# Patient Record
Sex: MALE | Race: WHITE | Employment: FULL TIME | URBAN - METROPOLITAN AREA
[De-identification: names, ages, dates, MRNs, and addresses within clinical notes are randomized per-mention and may not be internally consistent; named-entity substitution may affect disease eponyms.]

---

## 2018-10-08 PROCEDURE — 88112 CYTOPATH CELL ENHANCE TECH: CPT | Performed by: PATHOLOGY

## 2018-10-09 ENCOUNTER — LAB REQUISITION (OUTPATIENT)
Dept: LAB | Facility: HOSPITAL | Age: 38
End: 2018-10-09
Payer: COMMERCIAL

## 2018-10-09 DIAGNOSIS — R31.29 OTHER MICROSCOPIC HEMATURIA: ICD-10-CM

## 2019-05-15 ENCOUNTER — RX ONLY (RX ONLY)
Age: 39
End: 2019-05-15

## 2019-05-15 ENCOUNTER — DOCTOR'S OFFICE (OUTPATIENT)
Dept: URBAN - METROPOLITAN AREA CLINIC 137 | Facility: CLINIC | Age: 39
Setting detail: OPHTHALMOLOGY
End: 2019-05-15
Payer: COMMERCIAL

## 2019-05-15 DIAGNOSIS — H52.03: ICD-10-CM

## 2019-05-15 DIAGNOSIS — H52.223: ICD-10-CM

## 2019-05-15 PROCEDURE — 92015 DETERMINE REFRACTIVE STATE: CPT | Performed by: OPTOMETRIST

## 2019-05-15 PROCEDURE — 92310 CONTACT LENS FITTING OU: CPT | Performed by: OPTOMETRIST

## 2019-05-15 PROCEDURE — 92004 COMPRE OPH EXAM NEW PT 1/>: CPT | Performed by: OPTOMETRIST

## 2019-05-15 ASSESSMENT — REFRACTION_CURRENTRX
OD_VPRISM_DIRECTION: SV
OD_OVR_VA: 20/
OS_VPRISM_DIRECTION: SV
OD_CYLINDER: -1.25
OS_AXIS: 83
OS_SPHERE: +0.50
OS_OVR_VA: 20/
OD_SPHERE: +1.00
OS_CYLINDER: -1.00
OD_AXIS: 116
OD_OVR_VA: 20/
OS_OVR_VA: 20/
OD_OVR_VA: 20/
OS_OVR_VA: 20/

## 2019-05-15 ASSESSMENT — REFRACTION_MANIFEST
OS_VA2: 20/
OS_VA3: 20/
OU_VA: 20/
OD_VA2: 20/
OU_VA: 20/
OS_VA2: 20/
OS_SPHERE: PLANO
OS_VA3: 20/
OD_VA1: 20/
OS_VA1: 20/
OD_VA1: 20/20
OS_AXIS: 085
OD_AXIS: 110
OD_VA2: 20/
OD_SPHERE: +0.50
OS_CYLINDER: -1.00
OD_CYLINDER: -1.25
OD_VA3: 20/
OD_VA3: 20/
OS_VA1: 20/20

## 2019-05-15 ASSESSMENT — KERATOMETRY
OS_K1POWER_DIOPTERS: 44.75
OD_AXISANGLE_DEGREES: 155
OD_K2POWER_DIOPTERS: 45.00
OS_AXISANGLE_DEGREES: 19
OS_K2POWER_DIOPTERS: 45.25
OD_K1POWER_DIOPTERS: 44.25

## 2019-05-15 ASSESSMENT — REFRACTION_AUTOREFRACTION
OD_AXIS: 103
OS_AXIS: 87
OD_CYLINDER: -1.25
OD_SPHERE: 0.00
OS_CYLINDER: -0.75
OS_SPHERE: -0.50

## 2019-05-15 ASSESSMENT — CONFRONTATIONAL VISUAL FIELD TEST (CVF)
OS_FINDINGS: FULL
OD_FINDINGS: FULL

## 2019-05-15 ASSESSMENT — SPHEQUIV_DERIVED
OD_SPHEQUIV: -0.125
OS_SPHEQUIV: -0.875
OD_SPHEQUIV: -0.625

## 2019-05-15 ASSESSMENT — AXIALLENGTH_DERIVED
OS_AL: 23.3837
OD_AL: 23.2329
OD_AL: 23.4234

## 2019-05-15 ASSESSMENT — VISUAL ACUITY
OD_BCVA: 20/30-1
OS_BCVA: 20/30-1

## 2019-06-11 ENCOUNTER — OPTICAL OFFICE (OUTPATIENT)
Dept: URBAN - METROPOLITAN AREA CLINIC 146 | Facility: CLINIC | Age: 39
Setting detail: OPHTHALMOLOGY
End: 2019-06-11
Payer: COMMERCIAL

## 2019-06-11 DIAGNOSIS — H52.223: ICD-10-CM

## 2019-06-11 PROCEDURE — S0500 DISPOS CONT LENS: HCPCS | Performed by: OPTOMETRIST

## 2020-03-11 ENCOUNTER — OPTICAL OFFICE (OUTPATIENT)
Dept: URBAN - METROPOLITAN AREA CLINIC 146 | Facility: CLINIC | Age: 40
Setting detail: OPHTHALMOLOGY
End: 2020-03-11
Payer: COMMERCIAL

## 2020-03-11 DIAGNOSIS — H52.223: ICD-10-CM

## 2020-03-11 PROCEDURE — S0500 DISPOS CONT LENS: HCPCS | Performed by: OPTOMETRIST

## 2020-12-03 ENCOUNTER — DOCTOR'S OFFICE (OUTPATIENT)
Dept: URBAN - METROPOLITAN AREA CLINIC 137 | Facility: CLINIC | Age: 40
Setting detail: OPHTHALMOLOGY
End: 2020-12-03
Payer: COMMERCIAL

## 2020-12-03 DIAGNOSIS — H52.223: ICD-10-CM

## 2020-12-03 PROCEDURE — 92014 COMPRE OPH EXAM EST PT 1/>: CPT | Performed by: OPTOMETRIST

## 2020-12-03 PROCEDURE — 92310 CONTACT LENS FITTING OU: CPT | Performed by: OPTOMETRIST

## 2020-12-03 ASSESSMENT — SPHEQUIV_DERIVED
OD_SPHEQUIV: -0.125
OD_SPHEQUIV: -0.125
OD_SPHEQUIV: -0.25
OS_SPHEQUIV: -0.875

## 2020-12-03 ASSESSMENT — REFRACTION_CURRENTRX
OS_AXIS: 83
OD_CYLINDER: -1.25
OS_OVR_VA: 20/
OS_CYLINDER: -1.00
OS_SPHERE: +0.50
OD_AXIS: 116
OD_VPRISM_DIRECTION: SV
OS_VPRISM_DIRECTION: SV
OD_OVR_VA: 20/
OD_SPHERE: +1.00

## 2020-12-03 ASSESSMENT — AXIALLENGTH_DERIVED
OD_AL: 23.4152
OD_AL: 23.3673
OS_AL: 23.3387
OD_AL: 23.3673

## 2020-12-03 ASSESSMENT — REFRACTION_MANIFEST
OD_VA1: 20/20
OD_SPHERE: +0.50
OD_SPHERE: +0.50
OS_SPHERE: PL
OS_AXIS: 085
OS_VA1: 20/20
OS_CYLINDER: -1.00
OS_AXIS: 95
OD_CYLINDER: -1.25
OD_AXIS: 110
OS_CYLINDER: -1.25
OS_SPHERE: PLANO
OD_AXIS: 100
OD_VA1: 20/20
OS_VA1: 20/20
OD_CYLINDER: -1.50

## 2020-12-03 ASSESSMENT — REFRACTION_AUTOREFRACTION
OS_CYLINDER: -1.25
OD_CYLINDER: -1.25
OS_AXIS: 082
OS_SPHERE: -0.25
OD_SPHERE: +0.50
OD_AXIS: 113

## 2020-12-03 ASSESSMENT — TONOMETRY
OD_IOP_MMHG: 14
OS_IOP_MMHG: 16

## 2020-12-03 ASSESSMENT — CONFRONTATIONAL VISUAL FIELD TEST (CVF)
OD_FINDINGS: FULL
OS_FINDINGS: FULL

## 2020-12-03 ASSESSMENT — KERATOMETRY
OD_AXISANGLE_DEGREES: 154
OS_K1POWER_DIOPTERS: 44.75
OD_K2POWER_DIOPTERS: 44.75
OS_AXISANGLE_DEGREES: 16
OD_K1POWER_DIOPTERS: 43.75
OS_K2POWER_DIOPTERS: 45.50

## 2020-12-03 ASSESSMENT — VISUAL ACUITY
OS_BCVA: 20/20-1
OD_BCVA: 20/20

## 2020-12-15 ENCOUNTER — HOSPITAL ENCOUNTER (EMERGENCY)
Facility: HOSPITAL | Age: 40
Discharge: HOME | End: 2020-12-15
Attending: EMERGENCY MEDICINE | Admitting: EMERGENCY MEDICINE
Payer: COMMERCIAL

## 2020-12-15 VITALS
HEART RATE: 60 BPM | HEIGHT: 75 IN | TEMPERATURE: 97.7 F | BODY MASS INDEX: 31.46 KG/M2 | SYSTOLIC BLOOD PRESSURE: 140 MMHG | DIASTOLIC BLOOD PRESSURE: 84 MMHG | OXYGEN SATURATION: 97 % | WEIGHT: 253 LBS | RESPIRATION RATE: 18 BRPM

## 2020-12-15 DIAGNOSIS — F41.9 ANXIETY: ICD-10-CM

## 2020-12-15 DIAGNOSIS — R00.2 INTERMITTENT PALPITATIONS: Primary | ICD-10-CM

## 2020-12-15 LAB
ANION GAP SERPL CALCULATED.3IONS-SCNC: 11 MMOL/L (ref 4–13)
BASOPHILS # BLD AUTO: 0.04 THOUSANDS/ΜL (ref 0–0.1)
BASOPHILS NFR BLD AUTO: 1 % (ref 0–1)
BUN SERPL-MCNC: 11 MG/DL (ref 5–25)
CALCIUM SERPL-MCNC: 9.3 MG/DL (ref 8.3–10.1)
CHLORIDE SERPL-SCNC: 104 MMOL/L (ref 100–108)
CO2 SERPL-SCNC: 27 MMOL/L (ref 21–32)
CREAT SERPL-MCNC: 0.91 MG/DL (ref 0.6–1.3)
EOSINOPHIL # BLD AUTO: 0.07 THOUSAND/ΜL (ref 0–0.61)
EOSINOPHIL NFR BLD AUTO: 1 % (ref 0–6)
ERYTHROCYTE [DISTWIDTH] IN BLOOD BY AUTOMATED COUNT: 12.6 % (ref 11.6–15.1)
GFR SERPL CREATININE-BSD FRML MDRD: 104 ML/MIN/1.73SQ M
GLUCOSE SERPL-MCNC: 99 MG/DL (ref 65–140)
HCT VFR BLD AUTO: 47.2 % (ref 36.5–49.3)
HGB BLD-MCNC: 15.7 G/DL (ref 12–17)
IMM GRANULOCYTES # BLD AUTO: 0.02 THOUSAND/UL (ref 0–0.2)
IMM GRANULOCYTES NFR BLD AUTO: 0 % (ref 0–2)
LYMPHOCYTES # BLD AUTO: 1.45 THOUSANDS/ΜL (ref 0.6–4.47)
LYMPHOCYTES NFR BLD AUTO: 24 % (ref 14–44)
MCH RBC QN AUTO: 29.7 PG (ref 26.8–34.3)
MCHC RBC AUTO-ENTMCNC: 33.3 G/DL (ref 31.4–37.4)
MCV RBC AUTO: 89 FL (ref 82–98)
MONOCYTES # BLD AUTO: 0.52 THOUSAND/ΜL (ref 0.17–1.22)
MONOCYTES NFR BLD AUTO: 9 % (ref 4–12)
NEUTROPHILS # BLD AUTO: 4.05 THOUSANDS/ΜL (ref 1.85–7.62)
NEUTS SEG NFR BLD AUTO: 65 % (ref 43–75)
NRBC BLD AUTO-RTO: 0 /100 WBCS
PLATELET # BLD AUTO: 174 THOUSANDS/UL (ref 149–390)
PMV BLD AUTO: 10.4 FL (ref 8.9–12.7)
POTASSIUM SERPL-SCNC: 3.5 MMOL/L (ref 3.5–5.3)
RBC # BLD AUTO: 5.28 MILLION/UL (ref 3.88–5.62)
SODIUM SERPL-SCNC: 142 MMOL/L (ref 136–145)
TROPONIN I SERPL-MCNC: <0.02 NG/ML
TSH SERPL DL<=0.05 MIU/L-ACNC: 1.14 UIU/ML (ref 0.36–3.74)
WBC # BLD AUTO: 6.15 THOUSAND/UL (ref 4.31–10.16)

## 2020-12-15 PROCEDURE — 99285 EMERGENCY DEPT VISIT HI MDM: CPT

## 2020-12-15 PROCEDURE — 84443 ASSAY THYROID STIM HORMONE: CPT | Performed by: EMERGENCY MEDICINE

## 2020-12-15 PROCEDURE — 99285 EMERGENCY DEPT VISIT HI MDM: CPT | Performed by: EMERGENCY MEDICINE

## 2020-12-15 PROCEDURE — 93005 ELECTROCARDIOGRAM TRACING: CPT

## 2020-12-15 PROCEDURE — 85025 COMPLETE CBC W/AUTO DIFF WBC: CPT | Performed by: EMERGENCY MEDICINE

## 2020-12-15 PROCEDURE — 84484 ASSAY OF TROPONIN QUANT: CPT | Performed by: EMERGENCY MEDICINE

## 2020-12-15 PROCEDURE — 36415 COLL VENOUS BLD VENIPUNCTURE: CPT | Performed by: EMERGENCY MEDICINE

## 2020-12-15 PROCEDURE — 80048 BASIC METABOLIC PNL TOTAL CA: CPT | Performed by: EMERGENCY MEDICINE

## 2020-12-16 LAB
ATRIAL RATE: 62 BPM
P AXIS: 66 DEGREES
PR INTERVAL: 154 MS
QRS AXIS: 74 DEGREES
QRSD INTERVAL: 84 MS
QT INTERVAL: 406 MS
QTC INTERVAL: 406 MS
T WAVE AXIS: 63 DEGREES
VENTRICULAR RATE: 60 BPM

## 2020-12-16 PROCEDURE — 93010 ELECTROCARDIOGRAM REPORT: CPT | Performed by: INTERNAL MEDICINE

## 2024-09-09 ENCOUNTER — OFFICE VISIT (OUTPATIENT)
Dept: SURGERY | Facility: CLINIC | Age: 44
End: 2024-09-09
Payer: COMMERCIAL

## 2024-09-09 VITALS
HEIGHT: 75 IN | HEART RATE: 75 BPM | WEIGHT: 271.6 LBS | TEMPERATURE: 96.9 F | SYSTOLIC BLOOD PRESSURE: 140 MMHG | BODY MASS INDEX: 33.77 KG/M2 | DIASTOLIC BLOOD PRESSURE: 96 MMHG | OXYGEN SATURATION: 97 %

## 2024-09-09 DIAGNOSIS — K57.30 SIGMOID DIVERTICULOSIS: Primary | ICD-10-CM

## 2024-09-09 DIAGNOSIS — K40.20 BILATERAL INGUINAL HERNIA: ICD-10-CM

## 2024-09-09 PROCEDURE — 99204 OFFICE O/P NEW MOD 45 MIN: CPT | Performed by: SURGERY

## 2024-09-09 RX ORDER — TERBINAFINE HYDROCHLORIDE 250 MG/1
250 TABLET ORAL DAILY
COMMUNITY
Start: 2024-06-27

## 2024-09-09 RX ORDER — LISINOPRIL 20 MG/1
20 TABLET ORAL DAILY
COMMUNITY
Start: 2024-08-16

## 2024-09-09 RX ORDER — LORAZEPAM 0.5 MG/1
TABLET ORAL EVERY 8 HOURS PRN
COMMUNITY

## 2024-09-09 NOTE — PROGRESS NOTES
Ambulatory Visit  Name: Brandon Fraire      : 1980      MRN: [id number]  Encounter Provider: Laron Lomeli MD  Encounter Date: 2024   Encounter department: [facility] GENERAL SURGERY [place]    Assessment & Plan   1. Sigmoid diverticulosis  2. Bilateral inguinal hernia    Patient does not want any surgery for inguinal hernia.  Dietary advice was given.  Weight loss advice was given.  Since he recently had colonoscopy which was a complete colonoscopy we did not need to repeat it now.  He will call me if his symptoms change.  Follow-up with me in 6 months or as needed.  History of Present Illness     Brandon Fraire is a 44 y.o. male who presents to my office for consultation.  He says he would like me to review his CT scan of abdomen and pelvis which was done at [facility] about 10 days ago.  He says he had his first episode of sigmoid diverticulitis in March which resolved on oral antibiotics.  He says after that he had a colonoscopy and some polyps were removed which were benign.  He showed me the colonoscopy report on his cell phone.  He tells me that 10 days ago he had similar pain for which he was on oral antibiotics.  The pain did not resolve and he went to the ER and a CT scan was done which showed sigmoid diverticulosis, bilateral inguinal hernia, and mural thickening in the sigmoid colon likely sequelae of diverticulitis.  Patient does not complain of any nausea vomiting, abdominal pain, blood in stools, weight loss, change in bowel habits.    Review of Systems   Constitutional: Negative.    HENT: Negative.     Eyes: Negative.    Respiratory: Negative.     Cardiovascular: Negative.    Gastrointestinal: Negative.    Endocrine: Negative.    Genitourinary: Negative.    Musculoskeletal: Negative.    Allergic/Immunologic: Negative.    Neurological: Negative.    Hematological: Negative.    Psychiatric/Behavioral: Negative.       Medical History Reviewed by provider this encounter:   "Tobacco  Allergies  Problems  Med Hx  Surg Hx  Fam Hx       Past Medical History   Past Medical History:   Diagnosis Date    Colon polyp 3/15/24    Diverticulitis of colon 3/24/24    Hypertension      Past Surgical History:   Procedure Laterality Date    CHOLECYSTECTOMY      COLONOSCOPY  3/15/24    HERNIA REPAIR       Family History   Problem Relation Age of Onset    Ovarian cancer Mother      Current Outpatient Medications on File Prior to Visit   Medication Sig Dispense Refill    lisinopril (ZESTRIL) 20 mg tablet Take 20 mg by mouth daily      LORazepam (ATIVAN) 0.5 mg tablet Take by mouth every 8 (eight) hours as needed for anxiety      terbinafine (LamISIL) 250 mg tablet Take 250 mg by mouth daily       No current facility-administered medications on file prior to visit.   No Known Allergies   Current Outpatient Medications on File Prior to Visit   Medication Sig Dispense Refill    lisinopril (ZESTRIL) 20 mg tablet Take 20 mg by mouth daily      LORazepam (ATIVAN) 0.5 mg tablet Take by mouth every 8 (eight) hours as needed for anxiety      terbinafine (LamISIL) 250 mg tablet Take 250 mg by mouth daily       No current facility-administered medications on file prior to visit.      Social History     Tobacco Use    Smoking status: Never    Smokeless tobacco: Never   Substance and Sexual Activity    Alcohol use: Yes    Drug use: Not Currently    Sexual activity: Yes     Partners: Female     Birth control/protection: Male Sterilization     Objective     /96 (BP Location: Left arm, Patient Position: Sitting, Cuff Size: Standard)   Pulse 75   Temp (!) 96.9 °F (36.1 °C) (Tympanic)   Ht 6' 3\" (1.905 m)   Wt 123 kg (271 lb 9.6 oz)   SpO2 97%   BMI 33.95 kg/m²     Physical Exam  Vitals reviewed.   Constitutional:       Appearance: He is obese.   HENT:      Head: Normocephalic.      Nose: Nose normal.   Eyes:      Pupils: Pupils are equal, round, and reactive to light.   Cardiovascular:      Rate and " Rhythm: Normal rate and regular rhythm.   Pulmonary:      Effort: Pulmonary effort is normal.      Breath sounds: Normal breath sounds.   Abdominal:      General: Bowel sounds are normal. There is no distension.      Palpations: Abdomen is soft. There is no mass.      Tenderness: There is no abdominal tenderness. There is no guarding or rebound.      Comments: Bilateral inguinal cough impulse present.  No discrete hernia clinically present.   Genitourinary:     Penis: Normal.       Testes: Normal.   Musculoskeletal:      Cervical back: Normal range of motion.   Skin:     General: Skin is warm.   Neurological:      General: No focal deficit present.      Mental Status: He is alert and oriented to person, place, and time.   Psychiatric:         Mood and Affect: Mood normal.       Administrative Statements   I have spent a total time of 30 minutes in caring for this patient on the day of the visit/encounter including Diagnostic results, Prognosis, Risks and benefits of tx options, Instructions for management, Patient and family education, Importance of tx compliance, Risk factor reductions, Impressions, Counseling / Coordination of care, Documenting in the medical record, Reviewing / ordering tests, medicine, procedures  , Obtaining or reviewing history  , and Communicating with other healthcare professionals .

## 2024-09-20 ENCOUNTER — TELEPHONE (OUTPATIENT)
Age: 44
End: 2024-09-20

## 2024-09-20 ENCOUNTER — NURSE TRIAGE (OUTPATIENT)
Age: 44
End: 2024-09-20

## 2024-09-20 NOTE — TELEPHONE ENCOUNTER
Pt called in stating is having a diverticulitis episode. Unable to reach the practice. Warm transfer to CTS pod.

## 2024-09-20 NOTE — TELEPHONE ENCOUNTER
"Pt warm transferred from [place] to this CTS-RN. Pt with hx diverticulitis, previously treated by PCP, and last seen by Dr. Lomeli on 9/9/24.    Pt reports having another diverticulitis episode since yesterday. Endorses yesterday, having \"dull pain\" in LLQ with nausea, and this am awaking with severe 7-8/10 pain that feels the same as previous diverticulitis episodes.     Pt unsure if he has a fever; Pt has been taking tylenol to try to help with the pain w/o relief. Denies diarrhea/vomiting. Pt does not want to go to ED and would like to come into office today.     Called [facility] office clinical backline and spoke with Anu. Per Anu, no providers in office today and advised that Pt go to ED.     Updated Pt. Pt verbalized understanding and adamant that he does not want to go to ED. Advised Pt to call PCP; Pt verbalized understanding. Appt scheduled for Monday.   Reason for Disposition   Constant abdominal pain lasting > 2 hours    Answer Assessment - Initial Assessment Questions  1. LOCATION: \"Where does it hurt?\"       LLQ  2. RADIATION: \"Does the pain shoot anywhere else?\" (e.g., chest, back)      denies  3. ONSET: \"When did the pain begin?\" (Minutes, hours or days ago)       yesterday  4. SUDDEN: \"Gradual or sudden onset?\"      gradual  5. PATTERN \"Does the pain come and go, or is it constant?\"     - If constant: \"Is it getting better, staying the same, or worsening?\"       (Note: Constant means the pain never goes away completely; most serious pain is constant and it progresses)      - If intermittent: \"How long does it last?\" \"Do you have pain now?\"      (Note: Intermittent means the pain goes away completely between bouts)      constant  6. SEVERITY: \"How bad is the pain?\"  (e.g., Scale 1-10; mild, moderate, or severe)     - MILD (1-3): doesn't interfere with normal activities, abdomen soft and not tender to touch      - MODERATE (4-7): interferes with normal activities or awakens from sleep, tender to touch " "     - SEVERE (8-10): excruciating pain, doubled over, unable to do any normal activities        Moderate-severe  7. RECURRENT SYMPTOM: \"Have you ever had this type of stomach pain before?\" If Yes, ask: \"When was the last time?\" and \"What happened that time?\"       Yes, diverticulitis  8. CAUSE: \"What do you think is causing the stomach pain?\"      diverticulitis  9. RELIEVING/AGGRAVATING FACTORS: \"What makes it better or worse?\" (e.g., movement, antacids, bowel movement)      nothing  10. OTHER SYMPTOMS: \"Has there been any vomiting, diarrhea, constipation, or urine problems?\"        Nausea yesterday    Protocols used: Abdominal Pain - Male-ADULT-[place]    "

## 2024-09-23 ENCOUNTER — OFFICE VISIT (OUTPATIENT)
Dept: SURGERY | Facility: CLINIC | Age: 44
End: 2024-09-23
Payer: COMMERCIAL

## 2024-09-23 VITALS
SYSTOLIC BLOOD PRESSURE: 156 MMHG | HEIGHT: 75 IN | BODY MASS INDEX: 33.45 KG/M2 | OXYGEN SATURATION: 98 % | HEART RATE: 76 BPM | WEIGHT: 269 LBS | TEMPERATURE: 98 F | DIASTOLIC BLOOD PRESSURE: 106 MMHG

## 2024-09-23 DIAGNOSIS — K57.92 ACUTE DIVERTICULITIS: Primary | ICD-10-CM

## 2024-09-23 PROCEDURE — 99215 OFFICE O/P EST HI 40 MIN: CPT | Performed by: SURGERY

## 2024-09-23 RX ORDER — OXYCODONE HYDROCHLORIDE 10 MG/1
10 TABLET ORAL EVERY 4 HOURS
COMMUNITY
Start: 2024-09-20

## 2024-09-23 RX ORDER — LEVOFLOXACIN 750 MG/1
750 TABLET, FILM COATED ORAL DAILY
COMMUNITY
Start: 2024-09-20

## 2024-09-23 RX ORDER — METRONIDAZOLE 500 MG/1
500 TABLET ORAL EVERY 8 HOURS
COMMUNITY
Start: 2024-09-20

## 2024-09-23 RX ORDER — OXYCODONE AND ACETAMINOPHEN 5; 325 MG/1; MG/1
1 TABLET ORAL EVERY 6 HOURS PRN
Qty: 12 TABLET | Refills: 0 | Status: SHIPPED | OUTPATIENT
Start: 2024-09-23

## 2024-09-23 NOTE — PROGRESS NOTES
Ambulatory Visit  Name: Brandon Fraire      : 1980      MRN: [id number]  Encounter Provider: Laron Lomeli MD  Encounter Date: 2024   Encounter department: [facility] GENERAL SURGERY [place]    Assessment & Plan  Acute diverticulitis  Patient has most likely acute sigmoid diverticulitis.  I told him to continue oral antibiotics.  I am going to prescribe him some pain medication.  He was asked to drink a lot of liquids.  I am ordering a CT scan of abdomen pelvis with oral and IV contrast.  I also told him that if he starts having high-grade fever, generalized abdominal pain then he should come to the ER.  He is going to see me again at the end of the week.  We also talked about him thinking about definitive surgical procedure because this is his third episode.  I did offer him hospitalization however he feels he would be better managing this episode at home.         History of Present Illness     Brandon Fraire is a 44 y.o. male who presents to my office with 3-day history of left lower quadrant pain.  He says that the pain started on Friday.  It was 9 out of 10.  He went to see his primary care physician.  He was started on oral antibiotics.  He says now the pain is 7 or 8 out of 10.  No nausea or vomiting.  He is tolerating regular diet.  He is having regular bowel movements.  No blood in stools.  No complaints of generalized abdominal pain.  No complaints of fever.  Patient tells me that he would like to manage this episode as outpatient.    History obtained from : patient  Review of Systems   Constitutional: Negative.    HENT: Negative.     Eyes: Negative.    Respiratory: Negative.     Cardiovascular: Negative.    Gastrointestinal:  Positive for abdominal pain. Negative for abdominal distention, anal bleeding, blood in stool, constipation, diarrhea, nausea, rectal pain and vomiting.   Endocrine: Negative.    Genitourinary: Negative.    Musculoskeletal: Negative.    Skin: Negative.     Allergic/Immunologic: Negative.    Neurological: Negative.    Hematological: Negative.    Psychiatric/Behavioral: Negative.       Medical History Reviewed by provider this encounter:  Tobacco  Allergies  Meds  Problems  Med Hx  Surg Hx  Fam Hx       Past Medical History   Past Medical History:   Diagnosis Date    Colon polyp 3/15/24    Diverticulitis of colon 3/24/24    Hypertension      Past Surgical History:   Procedure Laterality Date    CHOLECYSTECTOMY      COLONOSCOPY  3/15/24    HERNIA REPAIR       Family History   Problem Relation Age of Onset    Ovarian cancer Mother      Current Outpatient Medications on File Prior to Visit   Medication Sig Dispense Refill    levofloxacin (LEVAQUIN) 750 mg tablet Take 750 mg by mouth daily      lisinopril (ZESTRIL) 20 mg tablet Take 20 mg by mouth daily      metroNIDAZOLE (FLAGYL) 500 mg tablet Take 500 mg by mouth every 8 (eight) hours      oxyCODONE (ROXICODONE) 10 MG TABS Take 10 mg by mouth every 4 (four) hours      LORazepam (ATIVAN) 0.5 mg tablet Take by mouth every 8 (eight) hours as needed for anxiety (Patient not taking: Reported on 9/23/2024)      terbinafine (LamISIL) 250 mg tablet Take 250 mg by mouth daily (Patient not taking: Reported on 9/23/2024)       No current facility-administered medications on file prior to visit.   No Known Allergies   Current Outpatient Medications on File Prior to Visit   Medication Sig Dispense Refill    levofloxacin (LEVAQUIN) 750 mg tablet Take 750 mg by mouth daily      lisinopril (ZESTRIL) 20 mg tablet Take 20 mg by mouth daily      metroNIDAZOLE (FLAGYL) 500 mg tablet Take 500 mg by mouth every 8 (eight) hours      oxyCODONE (ROXICODONE) 10 MG TABS Take 10 mg by mouth every 4 (four) hours      LORazepam (ATIVAN) 0.5 mg tablet Take by mouth every 8 (eight) hours as needed for anxiety (Patient not taking: Reported on 9/23/2024)      terbinafine (LamISIL) 250 mg tablet Take 250 mg by mouth daily (Patient not taking:  "Reported on 9/23/2024)       No current facility-administered medications on file prior to visit.      Social History     Tobacco Use    Smoking status: Never    Smokeless tobacco: Never   Substance and Sexual Activity    Alcohol use: Yes    Drug use: Not Currently    Sexual activity: Yes     Partners: Female     Birth control/protection: Male Sterilization         Objective     BP (!) 156/106 (BP Location: Left arm, Patient Position: Sitting, Cuff Size: Adult)   Pulse 76   Temp 98 °F (36.7 °C) (Tympanic)   Ht 6' 3\" (1.905 m)   Wt 122 kg (269 lb)   SpO2 98%   BMI 33.62 kg/m²     Physical Exam  Vitals reviewed.   Constitutional:       Appearance: He is obese.   HENT:      Head: Normocephalic.      Nose: Nose normal.   Eyes:      Pupils: Pupils are equal, round, and reactive to light.   Cardiovascular:      Rate and Rhythm: Normal rate and regular rhythm.   Pulmonary:      Effort: Pulmonary effort is normal.      Breath sounds: Normal breath sounds.   Abdominal:      General: Abdomen is flat. There is no distension.      Palpations: Abdomen is soft. There is no mass.      Tenderness: There is abdominal tenderness (Left lower quadrant tenderness.  No rebound.). There is guarding. There is no rebound.      Hernia: No hernia is present.   Musculoskeletal:         General: Normal range of motion.      Cervical back: Normal range of motion.   Skin:     General: Skin is warm.   Neurological:      General: No focal deficit present.      Mental Status: He is alert.   Psychiatric:         Mood and Affect: Mood normal.       Administrative Statements   I have spent a total time of 45 minutes in caring for this patient on the day of the visit/encounter including Diagnostic results, Prognosis, Risks and benefits of tx options, Instructions for management, Patient and family education, Importance of tx compliance, Risk factor reductions, Impressions, Counseling / Coordination of care, Documenting in the medical record, " Reviewing / ordering tests, medicine, procedures  , Obtaining or reviewing history  , and Communicating with other healthcare professionals .

## 2024-09-26 ENCOUNTER — HOSPITAL ENCOUNTER (OUTPATIENT)
Dept: CT IMAGING | Facility: HOSPITAL | Age: 44
End: 2024-09-26
Attending: SURGERY
Payer: COMMERCIAL

## 2024-09-26 DIAGNOSIS — K57.92 ACUTE DIVERTICULITIS: ICD-10-CM

## 2024-09-26 PROCEDURE — 74177 CT ABD & PELVIS W/CONTRAST: CPT

## 2024-09-26 RX ADMIN — IOHEXOL 75 ML: 350 INJECTION, SOLUTION INTRAVENOUS at 10:23

## 2024-09-27 ENCOUNTER — OFFICE VISIT (OUTPATIENT)
Dept: SURGERY | Facility: CLINIC | Age: 44
End: 2024-09-27
Payer: COMMERCIAL

## 2024-09-27 VITALS
TEMPERATURE: 97.2 F | HEART RATE: 73 BPM | DIASTOLIC BLOOD PRESSURE: 90 MMHG | HEIGHT: 75 IN | SYSTOLIC BLOOD PRESSURE: 128 MMHG | WEIGHT: 269.2 LBS | OXYGEN SATURATION: 97 % | BODY MASS INDEX: 33.47 KG/M2

## 2024-09-27 DIAGNOSIS — K57.92 ACUTE DIVERTICULITIS: Primary | ICD-10-CM

## 2024-09-27 PROCEDURE — 99213 OFFICE O/P EST LOW 20 MIN: CPT | Performed by: SURGERY

## 2024-09-27 NOTE — PROGRESS NOTES
Ambulatory Visit  Name: Brandon Fraire      : 1980      MRN: [id number]  Encounter Provider: Laron Lomeli MD  Encounter Date: 2024   Encounter department: [facility] SURGERY [place]    Assessment & Plan  Acute diverticulitis       Patient is doing well.  I told him that as per my review of CT scan images he does not have any free air or excessive inflammations.  We will wait for the CT scan results.  He was given a dietary referral for diet management.  He was also counseled regarding importance of weight loss.  Follow-up with me as needed .    History of Present Illness     Brandon Fraire is a 44 y.o. male who presents for follow-up.  He says that the pain is completely gone.  He is having regular bowel movements.  No nausea or vomiting.  No fevers.  He completed oral antibiotics today.  He had a CT scan of the abdomen pelvis yesterday which has not been reported.  I however looked at the pictures and discussed the pictures with him.    History obtained from : patient  Review of Systems   All other systems reviewed and are negative.    Medical History Reviewed by provider this encounter:  Tobacco  Allergies  Meds  Problems  Med Hx  Surg Hx  Fam Hx       Past Medical History   Past Medical History:   Diagnosis Date    Colon polyp 3/15/24    Diverticulitis of colon 3/24/24    Hypertension      Past Surgical History:   Procedure Laterality Date    CHOLECYSTECTOMY      COLONOSCOPY  3/15/24    HERNIA REPAIR       Family History   Problem Relation Age of Onset    Ovarian cancer Mother      Current Outpatient Medications on File Prior to Visit   Medication Sig Dispense Refill    levofloxacin (LEVAQUIN) 750 mg tablet Take 750 mg by mouth daily      lisinopril (ZESTRIL) 20 mg tablet Take 20 mg by mouth daily      metroNIDAZOLE (FLAGYL) 500 mg tablet Take 500 mg by mouth every 8 (eight) hours      LORazepam (ATIVAN) 0.5 mg tablet Take by mouth every 8 (eight) hours as needed for anxiety (Patient  not taking: Reported on 9/23/2024)      naloxone (NARCAN) 4 mg/0.1 mL nasal spray Administer 1 spray into a nostril. If no response after 2-3 minutes, give another dose in the other nostril using a new spray. 1 each 1    oxyCODONE (ROXICODONE) 10 MG TABS Take 10 mg by mouth every 4 (four) hours      oxyCODONE-acetaminophen (Percocet) 5-325 mg per tablet Take 1 tablet by mouth every 6 (six) hours as needed for moderate pain Max Daily Amount: 4 tablets 12 tablet 0    terbinafine (LamISIL) 250 mg tablet Take 250 mg by mouth daily (Patient not taking: Reported on 9/23/2024)       Current Facility-Administered Medications on File Prior to Visit   Medication Dose Route Frequency Provider Last Rate Last Admin    [COMPLETED] barium (READI-CAT 2) suspension 900 mL  900 mL Oral Once in imaging Laron Lomeli MD   900 mL at 09/26/24 1020    [COMPLETED] iohexol (OMNIPAQUE) 350 MG/ML injection (MULTI-DOSE) 75 mL  75 mL Intravenous Once in imaging Laron Lomeli MD   75 mL at 09/26/24 1023   No Known Allergies   Current Outpatient Medications on File Prior to Visit   Medication Sig Dispense Refill    levofloxacin (LEVAQUIN) 750 mg tablet Take 750 mg by mouth daily      lisinopril (ZESTRIL) 20 mg tablet Take 20 mg by mouth daily      metroNIDAZOLE (FLAGYL) 500 mg tablet Take 500 mg by mouth every 8 (eight) hours      LORazepam (ATIVAN) 0.5 mg tablet Take by mouth every 8 (eight) hours as needed for anxiety (Patient not taking: Reported on 9/23/2024)      naloxone (NARCAN) 4 mg/0.1 mL nasal spray Administer 1 spray into a nostril. If no response after 2-3 minutes, give another dose in the other nostril using a new spray. 1 each 1    oxyCODONE (ROXICODONE) 10 MG TABS Take 10 mg by mouth every 4 (four) hours      oxyCODONE-acetaminophen (Percocet) 5-325 mg per tablet Take 1 tablet by mouth every 6 (six) hours as needed for moderate pain Max Daily Amount: 4 tablets 12 tablet 0    terbinafine (LamISIL) 250 mg tablet Take 250 mg by  "mouth daily (Patient not taking: Reported on 9/23/2024)       Current Facility-Administered Medications on File Prior to Visit   Medication Dose Route Frequency Provider Last Rate Last Admin    [COMPLETED] barium (READI-CAT 2) suspension 900 mL  900 mL Oral Once in imaging Laron Lomeli MD   900 mL at 09/26/24 1020    [COMPLETED] iohexol (OMNIPAQUE) 350 MG/ML injection (MULTI-DOSE) 75 mL  75 mL Intravenous Once in imaging Laron Lomeli MD   75 mL at 09/26/24 1023      Social History     Tobacco Use    Smoking status: Never    Smokeless tobacco: Never   Substance and Sexual Activity    Alcohol use: Yes    Drug use: Not Currently    Sexual activity: Yes     Partners: Female     Birth control/protection: Male Sterilization         Objective     /90 (BP Location: Left arm, Patient Position: Sitting, Cuff Size: Standard)   Pulse 73   Temp (!) 97.2 °F (36.2 °C) (Tympanic)   Ht 6' 3\" (1.905 m)   Wt 122 kg (269 lb 3.2 oz)   SpO2 97%   BMI 33.65 kg/m²     Physical Exam  Vitals reviewed.   Constitutional:       Appearance: He is obese.   Cardiovascular:      Rate and Rhythm: Normal rate and regular rhythm.   Pulmonary:      Effort: Pulmonary effort is normal.      Breath sounds: Normal breath sounds.   Abdominal:      General: Abdomen is flat. Bowel sounds are normal.      Palpations: Abdomen is soft. There is no mass.      Tenderness: There is no abdominal tenderness.      Hernia: No hernia is present.   Musculoskeletal:         General: Normal range of motion.   Skin:     General: Skin is warm.   Neurological:      General: No focal deficit present.      Mental Status: He is alert.       Administrative Statements   I have spent a total time of 30 minutes in caring for this patient on the day of the visit/encounter including Diagnostic results, Prognosis, Risks and benefits of tx options, Instructions for management, Patient and family education, Importance of tx compliance, Risk factor reductions, Impressions, " Counseling / Coordination of care, Documenting in the medical record, Reviewing / ordering tests, medicine, procedures  , Obtaining or reviewing history  , and Communicating with other healthcare professionals .

## 2024-11-22 ENCOUNTER — TELEPHONE (OUTPATIENT)
Age: 44
End: 2024-11-22

## 2024-11-22 NOTE — TELEPHONE ENCOUNTER
Patient called in due to a possible diverticulitis flair up and asked to speak with a nurse, [email or web address] damon to [facility] for further assistance.

## 2024-11-22 NOTE — TELEPHONE ENCOUNTER
Patient of . Has a history of Diverticulitis. Has been on a new diet . Ate some raspberries yesterday and thinks this started a problem. Started last evening with constant dull aching pain in left lower quadrant of abdomen. Has no other systems. As Dr. Lomeli is not currently in the office , patient is going to contact PCP. Cannot afford to go to the ER.

## 2025-01-27 ENCOUNTER — OFFICE VISIT (OUTPATIENT)
Dept: SURGERY | Facility: CLINIC | Age: 45
End: 2025-01-27
Payer: COMMERCIAL

## 2025-01-27 VITALS
DIASTOLIC BLOOD PRESSURE: 84 MMHG | BODY MASS INDEX: 33.45 KG/M2 | WEIGHT: 269 LBS | OXYGEN SATURATION: 98 % | TEMPERATURE: 97.6 F | HEART RATE: 82 BPM | SYSTOLIC BLOOD PRESSURE: 136 MMHG | HEIGHT: 75 IN

## 2025-01-27 DIAGNOSIS — Z01.812 PRE-PROCEDURAL LABORATORY EXAMINATIONS: ICD-10-CM

## 2025-01-27 DIAGNOSIS — K57.30 SIGMOID DIVERTICULOSIS: ICD-10-CM

## 2025-01-27 DIAGNOSIS — K57.92 ACUTE DIVERTICULITIS: Primary | ICD-10-CM

## 2025-01-27 PROCEDURE — 99214 OFFICE O/P EST MOD 30 MIN: CPT | Performed by: SURGERY

## 2025-01-27 RX ORDER — ONDANSETRON 4 MG/1
4 TABLET, FILM COATED ORAL EVERY 8 HOURS PRN
Qty: 20 TABLET | Refills: 0 | Status: SHIPPED | OUTPATIENT
Start: 2025-01-27

## 2025-01-27 RX ORDER — LEVOFLOXACIN 750 MG/1
750 TABLET, FILM COATED ORAL EVERY 24 HOURS
Qty: 14 TABLET | Refills: 0 | Status: SHIPPED | OUTPATIENT
Start: 2025-01-27 | End: 2025-02-10

## 2025-01-27 RX ORDER — OXYCODONE AND ACETAMINOPHEN 10; 325 MG/1; MG/1
1 TABLET ORAL EVERY 6 HOURS PRN
Qty: 20 TABLET | Refills: 0 | Status: SHIPPED | OUTPATIENT
Start: 2025-01-27

## 2025-01-27 RX ORDER — AMLODIPINE BESYLATE 5 MG/1
5 TABLET ORAL DAILY
COMMUNITY
Start: 2024-10-29

## 2025-01-27 RX ORDER — METRONIDAZOLE 500 MG/1
500 TABLET ORAL EVERY 8 HOURS SCHEDULED
Qty: 42 TABLET | Refills: 0 | Status: SHIPPED | OUTPATIENT
Start: 2025-01-27 | End: 2025-02-10

## 2025-01-27 NOTE — PROGRESS NOTES
Name: Brandon Fraire      : 1980      MRN: [id number]  Encounter Provider: Laron Lomeli MD  Encounter Date: 2025   Encounter department: [facility]  :  Assessment & Plan  Acute diverticulitis    Orders:    CT abdomen pelvis w contrast; Future    levofloxacin (LEVAQUIN) 750 mg tablet; Take 1 tablet (750 mg total) by mouth every 24 hours for 14 days    metroNIDAZOLE (FLAGYL) 500 mg tablet; Take 1 tablet (500 mg total) by mouth every 8 (eight) hours for 14 days    ondansetron (ZOFRAN) 4 mg tablet; Take 1 tablet (4 mg total) by mouth every 8 (eight) hours as needed for nausea or vomiting    oxyCODONE-acetaminophen (Percocet)  mg per tablet; Take 1 tablet by mouth every 6 (six) hours as needed for moderate pain Max Daily Amount: 4 tablets    Sigmoid diverticulosis    Orders:    CT abdomen pelvis w contrast; Future    levofloxacin (LEVAQUIN) 750 mg tablet; Take 1 tablet (750 mg total) by mouth every 24 hours for 14 days    metroNIDAZOLE (FLAGYL) 500 mg tablet; Take 1 tablet (500 mg total) by mouth every 8 (eight) hours for 14 days    ondansetron (ZOFRAN) 4 mg tablet; Take 1 tablet (4 mg total) by mouth every 8 (eight) hours as needed for nausea or vomiting    oxyCODONE-acetaminophen (Percocet)  mg per tablet; Take 1 tablet by mouth every 6 (six) hours as needed for moderate pain Max Daily Amount: 4 tablets    Pre-procedural laboratory examinations    Orders:    Comprehensive metabolic panel; Future    Patient had colonoscopy done at [facility] 1 year ago.  We will get the documents.  I am starting him on oral antibiotics.  Patient does not want to go to the ER.  I told him that if he starts having more pain or the pain becomes generalized he should go to the ER.  I am going to see him after he comes back from [place].  I am giving him 2 weeks of antibiotics, antinausea medication as well as pain medication.  I also told him that he would be benefiting from  definitive surgery of sigmoid colectomy.    History of Present Illness   44-year-old male patient came to my office with complaints of left lower quadrant pain.  He says that the pain started last night.  He had once fever spike of 101 but he took Tylenol and it improved.  He was not found to have fever in my office.  No nausea or vomiting.  He had liquid bowel movement and had been passing flatus.  Patient says that he is flying out to [place] this evening for work.  He does not want to go to the ER.  This is his third such episode.      Brandon Fraire is a 44 y.o. male who presents   History obtained from: patient    Review of Systems   Constitutional:  Positive for fever.   HENT: Negative.     Eyes: Negative.    Respiratory: Negative.     Cardiovascular: Negative.    Gastrointestinal:  Positive for abdominal pain. Negative for anal bleeding, blood in stool, constipation, diarrhea, nausea, rectal pain and vomiting.   Endocrine: Negative.    Genitourinary: Negative.    Musculoskeletal: Negative.    Skin: Negative.    Allergic/Immunologic: Negative.    Neurological: Negative.    Hematological: Negative.    Psychiatric/Behavioral: Negative.       Medical History Reviewed by provider this encounter:  Tobacco  Allergies  Meds  Problems  Med Hx  Surg Hx  Fam Hx     .  Past Medical History   Past Medical History:   Diagnosis Date    Colon polyp 3/15/24    Diverticulitis of colon 3/24/24    Hypertension      Past Surgical History:   Procedure Laterality Date    CHOLECYSTECTOMY      COLONOSCOPY  3/15/24    HERNIA REPAIR       Family History   Problem Relation Age of Onset    Ovarian cancer Mother       reports that he has never smoked. He has never used smokeless tobacco. He reports current alcohol use. He reports that he does not currently use drugs.  Current Outpatient Medications on File Prior to Visit   Medication Sig Dispense Refill    amLODIPine (NORVASC) 5 mg tablet Take 5 mg by mouth daily      lisinopril  (ZESTRIL) 20 mg tablet Take 20 mg by mouth daily (Patient taking differently: Take 40 mg by mouth daily)      metroNIDAZOLE (FLAGYL) 500 mg tablet Take 500 mg by mouth every 8 (eight) hours      naloxone (NARCAN) 4 mg/0.1 mL nasal spray Administer 1 spray into a nostril. If no response after 2-3 minutes, give another dose in the other nostril using a new spray. 1 each 1    oxyCODONE (ROXICODONE) 10 MG TABS Take 10 mg by mouth every 4 (four) hours      oxyCODONE-acetaminophen (Percocet) 5-325 mg per tablet Take 1 tablet by mouth every 6 (six) hours as needed for moderate pain Max Daily Amount: 4 tablets 12 tablet 0    levofloxacin (LEVAQUIN) 750 mg tablet Take 750 mg by mouth daily (Patient not taking: Reported on 1/27/2025)      LORazepam (ATIVAN) 0.5 mg tablet Take by mouth every 8 (eight) hours as needed for anxiety (Patient not taking: Reported on 1/27/2025)      terbinafine (LamISIL) 250 mg tablet Take 250 mg by mouth daily (Patient not taking: Reported on 1/27/2025)       No current facility-administered medications on file prior to visit.   No Known Allergies   Current Outpatient Medications on File Prior to Visit   Medication Sig Dispense Refill    amLODIPine (NORVASC) 5 mg tablet Take 5 mg by mouth daily      lisinopril (ZESTRIL) 20 mg tablet Take 20 mg by mouth daily (Patient taking differently: Take 40 mg by mouth daily)      metroNIDAZOLE (FLAGYL) 500 mg tablet Take 500 mg by mouth every 8 (eight) hours      naloxone (NARCAN) 4 mg/0.1 mL nasal spray Administer 1 spray into a nostril. If no response after 2-3 minutes, give another dose in the other nostril using a new spray. 1 each 1    oxyCODONE (ROXICODONE) 10 MG TABS Take 10 mg by mouth every 4 (four) hours      oxyCODONE-acetaminophen (Percocet) 5-325 mg per tablet Take 1 tablet by mouth every 6 (six) hours as needed for moderate pain Max Daily Amount: 4 tablets 12 tablet 0    levofloxacin (LEVAQUIN) 750 mg tablet Take 750 mg by mouth daily (Patient  "not taking: Reported on 1/27/2025)      LORazepam (ATIVAN) 0.5 mg tablet Take by mouth every 8 (eight) hours as needed for anxiety (Patient not taking: Reported on 1/27/2025)      terbinafine (LamISIL) 250 mg tablet Take 250 mg by mouth daily (Patient not taking: Reported on 1/27/2025)       No current facility-administered medications on file prior to visit.      Social History     Tobacco Use    Smoking status: Never    Smokeless tobacco: Never   Substance and Sexual Activity    Alcohol use: Yes    Drug use: Not Currently    Sexual activity: Yes     Partners: Female     Birth control/protection: Male Sterilization        Objective   /84 (BP Location: Right arm, Patient Position: Sitting, Cuff Size: Standard)   Pulse 82   Temp 97.6 °F (36.4 °C) (Tympanic)   Ht 6' 3\" (1.905 m)   Wt 122 kg (269 lb)   SpO2 98%   BMI 33.62 kg/m²      Physical Exam  Vitals reviewed.   Constitutional:       Appearance: Normal appearance.   HENT:      Head: Normocephalic.      Nose: Nose normal.   Eyes:      Pupils: Pupils are equal, round, and reactive to light.   Cardiovascular:      Rate and Rhythm: Normal rate and regular rhythm.   Pulmonary:      Effort: Pulmonary effort is normal.      Breath sounds: Normal breath sounds.   Abdominal:      General: Abdomen is flat.      Palpations: Abdomen is soft.      Tenderness: There is abdominal tenderness (Tender in the left lower quadrant.). There is no guarding or rebound.      Hernia: No hernia is present.   Musculoskeletal:      Cervical back: Normal range of motion.   Neurological:      Mental Status: He is alert.         Administrative Statements   I have spent a total time of 30 minutes in caring for this patient on the day of the visit/encounter including Prognosis, Risks and benefits of tx options, Instructions for management, Patient and family education, Importance of tx compliance, Risk factor reductions, Impressions, Counseling / Coordination of care, Documenting in " the medical record, Reviewing / ordering tests, medicine, procedures  , Obtaining or reviewing history  , and Communicating with other healthcare professionals .

## 2025-02-16 ENCOUNTER — HOSPITAL ENCOUNTER (EMERGENCY)
Facility: HOSPITAL | Age: 45
Discharge: HOME | End: 2025-02-16
Attending: EMERGENCY MEDICINE | Admitting: EMERGENCY MEDICINE
Payer: COMMERCIAL

## 2025-02-16 ENCOUNTER — APPOINTMENT (EMERGENCY)
Dept: CT IMAGING | Facility: HOSPITAL | Age: 45
End: 2025-02-16
Payer: COMMERCIAL

## 2025-02-16 VITALS
DIASTOLIC BLOOD PRESSURE: 73 MMHG | TEMPERATURE: 97.8 F | HEART RATE: 76 BPM | OXYGEN SATURATION: 99 % | RESPIRATION RATE: 18 BRPM | SYSTOLIC BLOOD PRESSURE: 130 MMHG

## 2025-02-16 DIAGNOSIS — R10.9 ABDOMINAL PAIN: Primary | ICD-10-CM

## 2025-02-16 LAB
ALBUMIN SERPL BCG-MCNC: 4.7 G/DL (ref 3.5–5)
ALP SERPL-CCNC: 62 U/L (ref 34–104)
ALT SERPL W P-5'-P-CCNC: 27 U/L (ref 7–52)
ANION GAP SERPL CALCULATED.3IONS-SCNC: 10 MMOL/L (ref 4–13)
AST SERPL W P-5'-P-CCNC: 20 U/L (ref 13–39)
BASOPHILS # BLD AUTO: 0.05 THOUSANDS/ΜL (ref 0–0.1)
BASOPHILS NFR BLD AUTO: 1 % (ref 0–1)
BILIRUB DIRECT SERPL-MCNC: 0.13 MG/DL (ref 0–0.2)
BILIRUB SERPL-MCNC: 0.67 MG/DL (ref 0.2–1)
BUN SERPL-MCNC: 15 MG/DL (ref 5–25)
CALCIUM SERPL-MCNC: 9.5 MG/DL (ref 8.4–10.2)
CHLORIDE SERPL-SCNC: 103 MMOL/L (ref 96–108)
CO2 SERPL-SCNC: 27 MMOL/L (ref 21–32)
CREAT SERPL-MCNC: 0.94 MG/DL (ref 0.6–1.3)
EOSINOPHIL # BLD AUTO: 0.11 THOUSAND/ΜL (ref 0–0.61)
EOSINOPHIL NFR BLD AUTO: 2 % (ref 0–6)
ERYTHROCYTE [DISTWIDTH] IN BLOOD BY AUTOMATED COUNT: 12.5 % (ref 11.6–15.1)
GFR SERPL CREATININE-BSD FRML MDRD: 97 ML/MIN/1.73SQ M
GLUCOSE SERPL-MCNC: 91 MG/DL (ref 65–140)
HCT VFR BLD AUTO: 45.6 % (ref 36.5–49.3)
HGB BLD-MCNC: 14.9 G/DL (ref 12–17)
IMM GRANULOCYTES # BLD AUTO: 0.03 THOUSAND/UL (ref 0–0.2)
IMM GRANULOCYTES NFR BLD AUTO: 0 % (ref 0–2)
LIPASE SERPL-CCNC: 7 U/L (ref 11–82)
LYMPHOCYTES # BLD AUTO: 1.98 THOUSANDS/ΜL (ref 0.6–4.47)
LYMPHOCYTES NFR BLD AUTO: 28 % (ref 14–44)
MCH RBC QN AUTO: 29.9 PG (ref 26.8–34.3)
MCHC RBC AUTO-ENTMCNC: 32.7 G/DL (ref 31.4–37.4)
MCV RBC AUTO: 92 FL (ref 82–98)
MONOCYTES # BLD AUTO: 0.6 THOUSAND/ΜL (ref 0.17–1.22)
MONOCYTES NFR BLD AUTO: 9 % (ref 4–12)
NEUTROPHILS # BLD AUTO: 4.33 THOUSANDS/ΜL (ref 1.85–7.62)
NEUTS SEG NFR BLD AUTO: 60 % (ref 43–75)
NRBC BLD AUTO-RTO: 0 /100 WBCS
PLATELET # BLD AUTO: 167 THOUSANDS/UL (ref 149–390)
PMV BLD AUTO: 9.5 FL (ref 8.9–12.7)
POTASSIUM SERPL-SCNC: 4.1 MMOL/L (ref 3.5–5.3)
PROT SERPL-MCNC: 7 G/DL (ref 6.4–8.4)
RBC # BLD AUTO: 4.98 MILLION/UL (ref 3.88–5.62)
SODIUM SERPL-SCNC: 140 MMOL/L (ref 135–147)
WBC # BLD AUTO: 7.1 THOUSAND/UL (ref 4.31–10.16)

## 2025-02-16 PROCEDURE — 99284 EMERGENCY DEPT VISIT MOD MDM: CPT | Performed by: EMERGENCY MEDICINE

## 2025-02-16 PROCEDURE — 83690 ASSAY OF LIPASE: CPT | Performed by: EMERGENCY MEDICINE

## 2025-02-16 PROCEDURE — 99284 EMERGENCY DEPT VISIT MOD MDM: CPT

## 2025-02-16 PROCEDURE — 96365 THER/PROPH/DIAG IV INF INIT: CPT

## 2025-02-16 PROCEDURE — 80048 BASIC METABOLIC PNL TOTAL CA: CPT | Performed by: EMERGENCY MEDICINE

## 2025-02-16 PROCEDURE — 85025 COMPLETE CBC W/AUTO DIFF WBC: CPT | Performed by: EMERGENCY MEDICINE

## 2025-02-16 PROCEDURE — 96375 TX/PRO/DX INJ NEW DRUG ADDON: CPT

## 2025-02-16 PROCEDURE — 80076 HEPATIC FUNCTION PANEL: CPT | Performed by: EMERGENCY MEDICINE

## 2025-02-16 PROCEDURE — 74177 CT ABD & PELVIS W/CONTRAST: CPT

## 2025-02-16 PROCEDURE — 36415 COLL VENOUS BLD VENIPUNCTURE: CPT | Performed by: EMERGENCY MEDICINE

## 2025-02-16 RX ORDER — ACETAMINOPHEN 10 MG/ML
1000 INJECTION, SOLUTION INTRAVENOUS ONCE
Status: COMPLETED | OUTPATIENT
Start: 2025-02-16 | End: 2025-02-16

## 2025-02-16 RX ORDER — OXYCODONE HYDROCHLORIDE 5 MG/1
5 TABLET ORAL ONCE
Refills: 0 | Status: COMPLETED | OUTPATIENT
Start: 2025-02-16 | End: 2025-02-16

## 2025-02-16 RX ORDER — KETOROLAC TROMETHAMINE 30 MG/ML
15 INJECTION, SOLUTION INTRAMUSCULAR; INTRAVENOUS ONCE
Status: COMPLETED | OUTPATIENT
Start: 2025-02-16 | End: 2025-02-16

## 2025-02-16 RX ORDER — DICYCLOMINE HCL 20 MG
20 TABLET ORAL ONCE
Status: COMPLETED | OUTPATIENT
Start: 2025-02-16 | End: 2025-02-16

## 2025-02-16 RX ORDER — DICYCLOMINE HCL 20 MG
20 TABLET ORAL 2 TIMES DAILY PRN
Qty: 20 TABLET | Refills: 0 | Status: SHIPPED | OUTPATIENT
Start: 2025-02-16

## 2025-02-16 RX ADMIN — DICYCLOMINE HYDROCHLORIDE 20 MG: 20 TABLET ORAL at 16:43

## 2025-02-16 RX ADMIN — IOHEXOL 100 ML: 350 INJECTION, SOLUTION INTRAVENOUS at 16:16

## 2025-02-16 RX ADMIN — KETOROLAC TROMETHAMINE 15 MG: 30 INJECTION, SOLUTION INTRAMUSCULAR at 14:55

## 2025-02-16 RX ADMIN — OXYCODONE 5 MG: 5 TABLET ORAL at 14:55

## 2025-02-16 RX ADMIN — ACETAMINOPHEN 1000 MG: 1000 INJECTION, SOLUTION INTRAVENOUS at 16:44

## 2025-02-16 NOTE — DISCHARGE INSTRUCTIONS
CT scan showed no acute abnormalities.    Follow-up with your primary care provider.  Come back to the emergency department for new or worsening symptoms.    Take the Bentyl twice daily as needed for abdominal pain/spasms.

## 2025-02-16 NOTE — ED PROVIDER NOTES
Time reflects when diagnosis was documented in both MDM as applicable and the Disposition within this note       Time User Action Codes Description Comment    2/16/2025  6:00 PM Dvaid Guzman Add [R10.9] Abdominal pain           ED Disposition       ED Disposition   Discharge    Condition   Stable    Date/Time   Sun Feb 16, 2025  6:00 PM    Comment   Brandon Fraire discharge to home/self care.                   Assessment & Plan       Medical Decision Making  Patient with left lower quadrant pain.  Being treated for diverticulitis.    Differential including but not limited to diverticulitis, colitis, abdominal pain, renal colic.    Blood work without significant findings.  CT of the abdomen pelvis without significant findings.    Recommend follow-up with gastroenterology.  Patient states that he is scheduling a colonoscopy.    Return precautions given.    Problems Addressed:  Abdominal pain: acute illness or injury    Amount and/or Complexity of Data Reviewed  Labs: ordered.  Radiology: ordered.    Risk  Prescription drug management.             Medications   oxyCODONE (ROXICODONE) IR tablet 5 mg (5 mg Oral Given 2/16/25 1455)   ketorolac (TORADOL) injection 15 mg (15 mg Intravenous Given 2/16/25 1455)   iohexol (OMNIPAQUE) 350 MG/ML injection (SINGLE-DOSE) 100 mL (100 mL Intravenous Given 2/16/25 1616)   acetaminophen (Ofirmev) injection 1,000 mg (0 mg Intravenous Stopped 2/16/25 1712)   dicyclomine (BENTYL) tablet 20 mg (20 mg Oral Given 2/16/25 1643)       ED Risk Strat Scores                            SBIRT 20yo+      Flowsheet Row Most Recent Value   Initial Alcohol Screen: US AUDIT-C     1. How often do you have a drink containing alcohol? 0 Filed at: 02/16/2025 1422   2. How many drinks containing alcohol do you have on a typical day you are drinking?  0 Filed at: 02/16/2025 1422   3a. Male UNDER 65: How often do you have five or more drinks on one occasion? 0 Filed at: 02/16/2025 1422   Audit-C Score 0  Filed at: 02/16/2025 1426   CRYS: How many times in the past year have you...    Used an illegal drug or used a prescription medication for non-medical reasons? Never Filed at: 02/16/2025 1424                            History of Present Illness       Chief Complaint   Patient presents with    Abdominal Pain     Patient being treated for diverticulitis; pain has not subsided two days of antibiotics denies N/V/D        Past Medical History:   Diagnosis Date    Colon polyp 3/15/24    Diverticulitis of colon 3/24/24    Hypertension       Past Surgical History:   Procedure Laterality Date    CHOLECYSTECTOMY      COLONOSCOPY  3/15/24    HERNIA REPAIR        Family History   Problem Relation Age of Onset    Ovarian cancer Mother       Social History     Tobacco Use    Smoking status: Never    Smokeless tobacco: Never   Substance Use Topics    Alcohol use: Yes    Drug use: Not Currently      E-Cigarette/Vaping      E-Cigarette/Vaping Substances      I have reviewed and agree with the history as documented.     44-year-old male previous history of diverticulosis, diverticulitis, hypertension presenting with left lower quadrant abdominal pain.    Review of records reveals patient was seen in general surgery on the 27th of last month and prescribed 2 weeks of Levaquin and Flagyl.    Reports relief of the pain shortly after starting the antibiotics.  He reports that 2 days ago he developed left lower quadrant pain again.  He called his primary care provider who wrote him a prescription for Levaquin and Flagyl again.  He is taking his medications without relief at this point.  He is also taking oxycodone that was prescribed at his primary care provider.    Denies nausea, vomiting, fevers, chills, black or bloody bowel movements, dysuria, hematuria, increased frequency of urination.      Abdominal Pain  Pain location:  LLQ  Pain quality: sharp    Pain radiates to:  Does not radiate  Pain severity:  Moderate  Onset quality:   Gradual  Chronicity:  Chronic      Review of Systems   Gastrointestinal:  Positive for abdominal pain.   All other systems reviewed and are negative.          Objective       ED Triage Vitals [02/16/25 1423]   Temperature Pulse Blood Pressure Respirations SpO2 Patient Position - Orthostatic VS   97.8 °F (36.6 °C) 77 157/92 18 97 % Sitting      Temp Source Heart Rate Source BP Location FiO2 (%) Pain Score    Oral Monitor Left arm -- 7      Vitals      Date and Time Temp Pulse SpO2 Resp BP Pain Score FACES Pain Rating User   02/16/25 1552 -- 76 99 % 18 130/73 5 -- AM   02/16/25 1455 -- -- -- -- -- 8 -- BG   02/16/25 1423 97.8 °F (36.6 °C) 77 97 % 18 157/92 7 -- DU            Physical Exam  Vitals and nursing note reviewed.   Constitutional:       General: He is not in acute distress.     Appearance: He is well-developed. He is not diaphoretic.   HENT:      Head: Normocephalic and atraumatic.      Right Ear: External ear normal.      Left Ear: External ear normal.   Eyes:      Conjunctiva/sclera: Conjunctivae normal.   Neck:      Trachea: No tracheal deviation.   Cardiovascular:      Rate and Rhythm: Normal rate and regular rhythm.      Heart sounds: Normal heart sounds. No murmur heard.  Pulmonary:      Effort: No respiratory distress.      Breath sounds: Normal breath sounds. No stridor. No wheezing or rales.   Abdominal:      General: Bowel sounds are normal. There is no distension.      Palpations: Abdomen is soft. There is no mass.      Tenderness: There is abdominal tenderness in the left lower quadrant. There is no guarding or rebound. Negative signs include Harmon's sign and McBurney's sign.      Comments: No pain out of proportion.   Musculoskeletal:         General: No tenderness or deformity.   Skin:     General: Skin is dry.      Findings: No rash.   Neurological:      Motor: No abnormal muscle tone.      Coordination: Coordination normal.   Psychiatric:         Behavior: Behavior normal.         Thought  Content: Thought content normal.         Judgment: Judgment normal.         Results Reviewed       Procedure Component Value Units Date/Time    Basic metabolic panel [[id number]] Collected: 02/16/25 1451    Lab Status: Final result Specimen: Blood from Arm, Right Updated: 02/16/25 1515     Sodium 140 mmol/L      Potassium 4.1 mmol/L      Chloride 103 mmol/L      CO2 27 mmol/L      ANION GAP 10 mmol/L      BUN 15 mg/dL      Creatinine 0.94 mg/dL      Glucose 91 mg/dL      Calcium 9.5 mg/dL      eGFR 97 ml/min/1.73sq m     Narrative:      National Kid[employer] Disease Foundation guidelines for Chronic Kidney Disease (CKD):     Stage 1 with normal or high GFR (GFR > 90 mL/min/1.73 square meters)    Stage 2 Mild CKD (GFR = 60-89 mL/min/1.73 square meters)    Stage 3A Moderate CKD (GFR = 45-59 mL/min/1.73 square meters)    Stage 3B Moderate CKD (GFR = 30-44 mL/min/1.73 square meters)    Stage 4 Severe CKD (GFR = 15-29 mL/min/1.73 square meters)    Stage 5 End Stage CKD (GFR <15 mL/min/1.73 square meters)  Note: GFR calculation is accurate only with a steady state creatinine    Hepatic function panel [[id number]]  (Normal) Collected: 02/16/25 1451    Lab Status: Final result Specimen: Blood from Arm, Right Updated: 02/16/25 1515     Total Bilirubin 0.67 mg/dL      Bilirubin, Direct 0.13 mg/dL      Alkaline Phosphatase 62 U/L      AST 20 U/L      ALT 27 U/L      Total Protein 7.0 g/dL      Albumin 4.7 g/dL     Lipase [[id number]]  (Abnormal) Collected: 02/16/25 1451    Lab Status: Final result Specimen: Blood from Arm, Right Updated: 02/16/25 1515     Lipase 7 u/L     CBC and differential [[id number]] Collected: 02/16/25 1451    Lab Status: Final result Specimen: Blood from Arm, Right Updated: 02/16/25 1456     WBC 7.10 Thousand/uL      RBC 4.98 Million/uL      Hemoglobin 14.9 g/dL      Hematocrit 45.6 %      MCV 92 fL      MCH 29.9 pg      MCHC 32.7 g/dL      RDW 12.5 %      MPV 9.5 fL      Platelets 167 Thousands/uL      nRBC 0  /100 WBCs      Segmented % 60 %      Immature Grans % 0 %      Lymphocytes % 28 %      Monocytes % 9 %      Eosinophils Relative 2 %      Basophils Relative 1 %      Absolute Neutrophils 4.33 Thousands/µL      Absolute Immature Grans 0.03 Thousand/uL      Absolute Lymphocytes 1.98 Thousands/µL      Absolute Monocytes 0.60 Thousand/µL      Eosinophils Absolute 0.11 Thousand/µL      Basophils Absolute 0.05 Thousands/µL             CT abdomen pelvis with contrast   Final Interpretation by Diego Gardiner MD (02/16 1758)      No acute intra-abdominal finding.         Workstation performed: [id number]             Procedures    ED Medication and Procedure Management   Prior to Admission Medications   Prescriptions Last Dose Informant Patient Reported? Taking?   LORazepam (ATIVAN) 0.5 mg tablet   Yes No   Sig: Take by mouth every 8 (eight) hours as needed for anxiety   Patient not taking: Reported on 1/27/2025   amLODIPine (NORVASC) 5 mg tablet   Yes No   Sig: Take 5 mg by mouth daily   levofloxacin (LEVAQUIN) 750 mg tablet   Yes No   Sig: Take 750 mg by mouth daily   Patient not taking: Reported on 1/27/2025   lisinopril (ZESTRIL) 20 mg tablet   Yes No   Sig: Take 20 mg by mouth daily   Patient taking differently: Take 40 mg by mouth daily   metroNIDAZOLE (FLAGYL) 500 mg tablet   Yes No   Sig: Take 500 mg by mouth every 8 (eight) hours   naloxone (NARCAN) 4 mg/0.1 mL nasal spray   No No   Sig: Administer 1 spray into a nostril. If no response after 2-3 minutes, give another dose in the other nostril using a new spray.   ondansetron (ZOFRAN) 4 mg tablet   No No   Sig: Take 1 tablet (4 mg total) by mouth every 8 (eight) hours as needed for nausea or vomiting   oxyCODONE (ROXICODONE) 10 MG TABS   Yes No   Sig: Take 10 mg by mouth every 4 (four) hours   oxyCODONE-acetaminophen (Percocet)  mg per tablet   No No   Sig: Take 1 tablet by mouth every 6 (six) hours as needed for moderate pain Max Daily Amount: 4  tablets   oxyCODONE-acetaminophen (Percocet) 5-325 mg per tablet   No No   Sig: Take 1 tablet by mouth every 6 (six) hours as needed for moderate pain Max Daily Amount: 4 tablets   terbinafine (LamISIL) 250 mg tablet   Yes No   Sig: Take 250 mg by mouth daily   Patient not taking: Reported on 1/27/2025      Facility-Administered Medications: None     Patient's Medications   Discharge Prescriptions    DICYCLOMINE (BENTYL) 20 MG TABLET    Take 1 tablet (20 mg total) by mouth 2 (two) times a day as needed (abdominal pain)       Start Date: 2/16/2025 End Date: --       Order Dose: 20 mg       Quantity: 20 tablet    Refills: 0     No discharge procedures on file.  ED SEPSIS DOCUMENTATION   Time reflects when diagnosis was documented in both MDM as applicable and the Disposition within this note       Time User Action Codes Description Comment    2/16/2025  6:00 PM David Guzman Add [R10.9] Abdominal pain                  David Guzman,   02/16/25 1813

## 2025-02-16 NOTE — ED NOTES
Reviewed discharge instructions at bedside, no further questions.        Evelyn Skelton RN  02/16/25 [id number]

## 2025-02-19 ENCOUNTER — OFFICE VISIT (OUTPATIENT)
Dept: SURGERY | Facility: CLINIC | Age: 45
End: 2025-02-19
Payer: COMMERCIAL

## 2025-02-19 VITALS
HEART RATE: 80 BPM | BODY MASS INDEX: 32.45 KG/M2 | DIASTOLIC BLOOD PRESSURE: 90 MMHG | TEMPERATURE: 97.8 F | SYSTOLIC BLOOD PRESSURE: 138 MMHG | WEIGHT: 261 LBS | OXYGEN SATURATION: 98 % | HEIGHT: 75 IN

## 2025-02-19 DIAGNOSIS — K57.92 ACUTE DIVERTICULITIS: Primary | ICD-10-CM

## 2025-02-19 PROCEDURE — 99214 OFFICE O/P EST MOD 30 MIN: CPT | Performed by: SURGERY

## 2025-02-19 RX ORDER — ONDANSETRON 4 MG/1
4 TABLET, FILM COATED ORAL EVERY 8 HOURS PRN
Qty: 20 TABLET | Refills: 0 | Status: SHIPPED | OUTPATIENT
Start: 2025-02-19

## 2025-02-19 RX ORDER — LEVOFLOXACIN 750 MG/1
750 TABLET, FILM COATED ORAL EVERY 24 HOURS
Qty: 7 TABLET | Refills: 0 | Status: SHIPPED | OUTPATIENT
Start: 2025-02-19 | End: 2025-02-26

## 2025-02-19 RX ORDER — OXYCODONE AND ACETAMINOPHEN 5; 325 MG/1; MG/1
1 TABLET ORAL EVERY 8 HOURS PRN
Qty: 10 TABLET | Refills: 0 | Status: SHIPPED | OUTPATIENT
Start: 2025-02-19

## 2025-02-19 RX ORDER — METRONIDAZOLE 500 MG/1
500 TABLET ORAL EVERY 8 HOURS SCHEDULED
Qty: 21 TABLET | Refills: 0 | Status: SHIPPED | OUTPATIENT
Start: 2025-02-19 | End: 2025-02-26

## 2025-02-19 RX ORDER — KETOROLAC TROMETHAMINE 10 MG/1
10 TABLET, FILM COATED ORAL 2 TIMES DAILY
COMMUNITY
Start: 2025-02-17

## 2025-02-19 RX ORDER — KETOROLAC TROMETHAMINE 10 MG/1
10 TABLET, FILM COATED ORAL EVERY 8 HOURS PRN
Qty: 20 TABLET | Refills: 0 | Status: SHIPPED | OUTPATIENT
Start: 2025-02-19

## 2025-02-19 NOTE — PROGRESS NOTES
Name: Brandon Fraire      : 1980      MRN: [id number]  Encounter Provider: Laron Lomeli MD  Encounter Date: 2025   Encounter department: [facility]  :  Assessment & Plan  Acute diverticulitis    Orders:    levofloxacin (LEVAQUIN) 750 mg tablet; Take 1 tablet (750 mg total) by mouth every 24 hours for 7 days    metroNIDAZOLE (FLAGYL) 500 mg tablet; Take 1 tablet (500 mg total) by mouth every 8 (eight) hours for 7 days    ondansetron (ZOFRAN) 4 mg tablet; Take 1 tablet (4 mg total) by mouth every 8 (eight) hours as needed for nausea or vomiting    ketorolac (TORADOL) 10 mg tablet; Take 1 tablet (10 mg total) by mouth every 8 (eight) hours as needed for moderate pain    oxyCODONE-acetaminophen (Percocet) 5-325 mg per tablet; Take 1 tablet by mouth every 8 (eight) hours as needed for moderate pain Max Daily Amount: 3 tablets  He has another episode of sigmoid diverticulitis.  I gave him antibiotics and pain medication.  He is going out to another [place] for work.  He is going to be scheduled for a colonoscopy for next month.  I also told him that he needed definitive colectomy.  We will plan to do the colectomy in April/May based upon his schedule.  He verbalized understanding.  I asked him to go to the nearest ER if he starts having more severe pain.      History of Present Illness   44-year-old male patient came to my office complaints of left lower quadrant pain.  He went to the ER with this pain over the weekend.  This started him on oral antibiotics.  He has some nausea.  He is passing liquid stools.  No blood in stools.  No fever.      Brandon Fraire is a 44 y.o. male who presents   History obtained from: patient    Review of Systems   HENT: Negative.     Eyes: Negative.    Respiratory: Negative.     Cardiovascular: Negative.    Gastrointestinal:  Positive for abdominal pain. Negative for anal bleeding, blood in stool, constipation, diarrhea, nausea, rectal pain and vomiting.    Endocrine: Negative.    Genitourinary: Negative.    Musculoskeletal: Negative.    Skin: Negative.    Allergic/Immunologic: Negative.    Neurological: Negative.    Hematological: Negative.    Psychiatric/Behavioral: Negative.       Medical History Reviewed by provider this encounter:  Tobacco  Allergies  Meds  Problems  Med Hx  Surg Hx  Fam Hx     .  Past Medical History   Past Medical History:   Diagnosis Date    Colon polyp 3/15/24    Diverticulitis of colon 3/24/24    Hypertension      Past Surgical History:   Procedure Laterality Date    CHOLECYSTECTOMY      COLONOSCOPY  3/15/24    HERNIA REPAIR       Family History   Problem Relation Age of Onset    Ovarian cancer Mother       reports that he has never smoked. He has never used smokeless tobacco. He reports current alcohol use. He reports that he does not currently use drugs.  Current Outpatient Medications   Medication Instructions    amLODIPine (NORVASC) 5 mg, Daily    dicyclomine (BENTYL) 20 mg, Oral, 2 times daily PRN    ketorolac (TORADOL) 10 mg, 2 times daily    ketorolac (TORADOL) 10 mg, Oral, Every 8 hours PRN    levofloxacin (LEVAQUIN) 750 mg, Daily    levofloxacin (LEVAQUIN) 750 mg, Oral, Every 24 hours    lisinopril (ZESTRIL) 20 mg, Daily    LORazepam (ATIVAN) 0.5 mg tablet Every 8 hours PRN    metroNIDAZOLE (FLAGYL) 500 mg, Every 8 hours    metroNIDAZOLE (FLAGYL) 500 mg, Oral, Every 8 hours scheduled    naloxone (NARCAN) 4 mg/0.1 mL nasal spray Administer 1 spray into a nostril. If no response after 2-3 minutes, give another dose in the other nostril using a new spray.    ondansetron (ZOFRAN) 4 mg, Oral, Every 8 hours PRN    ondansetron (ZOFRAN) 4 mg, Oral, Every 8 hours PRN    oxyCODONE (ROXICODONE) 10 mg, Every 4 hours    oxyCODONE-acetaminophen (Percocet)  mg per tablet 1 tablet, Oral, Every 6 hours PRN    oxyCODONE-acetaminophen (Percocet) 5-325 mg per tablet 1 tablet, Oral, Every 6 hours PRN    oxyCODONE-acetaminophen (Percocet)  5-325 mg per tablet 1 tablet, Oral, Every 8 hours PRN    terbinafine (LAMISIL) 250 mg, Daily   No Known Allergies   Current Outpatient Medications on File Prior to Visit   Medication Sig Dispense Refill    amLODIPine (NORVASC) 5 mg tablet Take 5 mg by mouth daily      ketorolac (TORADOL) 10 mg tablet Take 10 mg by mouth 2 (two) times a day      lisinopril (ZESTRIL) 20 mg tablet Take 20 mg by mouth daily (Patient taking differently: Take 40 mg by mouth daily)      metroNIDAZOLE (FLAGYL) 500 mg tablet Take 500 mg by mouth every 8 (eight) hours      ondansetron (ZOFRAN) 4 mg tablet Take 1 tablet (4 mg total) by mouth every 8 (eight) hours as needed for nausea or vomiting 20 tablet 0    oxyCODONE-acetaminophen (Percocet)  mg per tablet Take 1 tablet by mouth every 6 (six) hours as needed for moderate pain Max Daily Amount: 4 tablets 20 tablet 0    dicyclomine (BENTYL) 20 mg tablet Take 1 tablet (20 mg total) by mouth 2 (two) times a day as needed (abdominal pain) (Patient not taking: Reported on 2/19/2025) 20 tablet 0    levofloxacin (LEVAQUIN) 750 mg tablet Take 750 mg by mouth daily (Patient not taking: Reported on 2/19/2025)      LORazepam (ATIVAN) 0.5 mg tablet Take by mouth every 8 (eight) hours as needed for anxiety (Patient not taking: Reported on 9/23/2024)      naloxone (NARCAN) 4 mg/0.1 mL nasal spray Administer 1 spray into a nostril. If no response after 2-3 minutes, give another dose in the other nostril using a new spray. (Patient not taking: Reported on 2/19/2025) 1 each 1    oxyCODONE (ROXICODONE) 10 MG TABS Take 10 mg by mouth every 4 (four) hours (Patient not taking: Reported on 2/19/2025)      oxyCODONE-acetaminophen (Percocet) 5-325 mg per tablet Take 1 tablet by mouth every 6 (six) hours as needed for moderate pain Max Daily Amount: 4 tablets (Patient not taking: Reported on 2/19/2025) 12 tablet 0    terbinafine (LamISIL) 250 mg tablet Take 250 mg by mouth daily (Patient not taking: Reported  "on 9/23/2024)       No current facility-administered medications on file prior to visit.      Social History     Tobacco Use    Smoking status: Never    Smokeless tobacco: Never   Substance and Sexual Activity    Alcohol use: Yes    Drug use: Not Currently    Sexual activity: Yes     Partners: Female     Birth control/protection: Male Sterilization        Objective   /90 (BP Location: Left arm, Patient Position: Sitting, Cuff Size: Standard)   Pulse 80   Temp 97.8 °F (36.6 °C) (Tympanic)   Ht 6' 3\" (1.905 m)   Wt 118 kg (261 lb)   SpO2 98%   BMI 32.62 kg/m²      Physical Exam  Vitals reviewed.   Constitutional:       Appearance: Normal appearance.   HENT:      Head: Normocephalic.      Nose: Nose normal.   Eyes:      Pupils: Pupils are equal, round, and reactive to light.   Cardiovascular:      Rate and Rhythm: Normal rate and regular rhythm.   Pulmonary:      Effort: Pulmonary effort is normal.      Breath sounds: Normal breath sounds.   Abdominal:      General: Abdomen is flat.      Palpations: Abdomen is soft.      Tenderness: There is abdominal tenderness (Tender in the left lower quadrant.). There is no guarding or rebound.      Hernia: No hernia is present.   Musculoskeletal:      Cervical back: Normal range of motion.   Neurological:      Mental Status: He is alert.         Administrative Statements   I have spent a total time of 40 minutes in caring for this patient on the day of the visit/encounter including Diagnostic results, Prognosis, Risks and benefits of tx options, Instructions for management, Patient and family education, Importance of tx compliance, Risk factor reductions, Impressions, Counseling / Coordination of care, Documenting in the medical record, Reviewing/placing orders in the medical record (including tests, medications, and/or procedures), Obtaining or reviewing history  , and Communicating with other healthcare professionals .  "

## 2025-03-11 ENCOUNTER — TELEPHONE (OUTPATIENT)
Age: 45
End: 2025-03-11

## 2025-03-11 NOTE — TELEPHONE ENCOUNTER
Patient called in to cancel his colonoscopy scheduled with dr mcfadden. He has an appt with Gastroenterology that he would like to have first before moving forward with colonoscopy. He states he will call back to reschedule.

## 2025-04-03 ENCOUNTER — TELEPHONE (OUTPATIENT)
Age: 45
End: 2025-04-03

## 2025-04-03 NOTE — TELEPHONE ENCOUNTER
Dr Lopez's office called in to speak with Dr Lomeli, I let them know he was not in the office today.  They asked if he could please call him tomorrow before 1:00 regarding this patient, his call back number is [phone]